# Patient Record
Sex: FEMALE | Race: OTHER
[De-identification: names, ages, dates, MRNs, and addresses within clinical notes are randomized per-mention and may not be internally consistent; named-entity substitution may affect disease eponyms.]

---

## 2017-07-09 ENCOUNTER — HOSPITAL ENCOUNTER (EMERGENCY)
Dept: HOSPITAL 62 - ER | Age: 57
Discharge: HOME | End: 2017-07-09
Payer: SELF-PAY

## 2017-07-09 VITALS — SYSTOLIC BLOOD PRESSURE: 157 MMHG | DIASTOLIC BLOOD PRESSURE: 78 MMHG

## 2017-07-09 DIAGNOSIS — R42: Primary | ICD-10-CM

## 2017-07-09 DIAGNOSIS — R29.818: ICD-10-CM

## 2017-07-09 DIAGNOSIS — R51: ICD-10-CM

## 2017-07-09 DIAGNOSIS — E11.9: ICD-10-CM

## 2017-07-09 LAB
ALBUMIN SERPL-MCNC: 4.1 G/DL (ref 3.5–5)
ALP SERPL-CCNC: 103 U/L (ref 38–126)
ALT SERPL-CCNC: 24 U/L (ref 9–52)
ANION GAP SERPL CALC-SCNC: 12 MMOL/L (ref 5–19)
AST SERPL-CCNC: 21 U/L (ref 14–36)
BASOPHILS # BLD AUTO: 0.1 10^3/UL (ref 0–0.2)
BASOPHILS NFR BLD AUTO: 1 % (ref 0–2)
BILIRUB DIRECT SERPL-MCNC: 0.3 MG/DL (ref 0–0.4)
BILIRUB SERPL-MCNC: 0.5 MG/DL (ref 0.2–1.3)
BUN SERPL-MCNC: 12 MG/DL (ref 7–20)
CALCIUM: 9.3 MG/DL (ref 8.4–10.2)
CHLORIDE SERPL-SCNC: 102 MMOL/L (ref 98–107)
CK MB SERPL-MCNC: 1.05 NG/ML (ref ?–4.55)
CK SERPL-CCNC: 106 U/L (ref 30–135)
CO2 SERPL-SCNC: 25 MMOL/L (ref 22–30)
CREAT SERPL-MCNC: 0.64 MG/DL (ref 0.52–1.25)
EOSINOPHIL # BLD AUTO: 0.1 10^3/UL (ref 0–0.6)
EOSINOPHIL NFR BLD AUTO: 0.8 % (ref 0–6)
ERYTHROCYTE [DISTWIDTH] IN BLOOD BY AUTOMATED COUNT: 16.8 % (ref 11.5–14)
GLUCOSE SERPL-MCNC: 190 MG/DL (ref 75–110)
HCT VFR BLD CALC: 39.7 % (ref 36–47)
HGB BLD-MCNC: 12.6 G/DL (ref 12–15.5)
HGB HCT DIFFERENCE: -1.9
LYMPHOCYTES # BLD AUTO: 1.9 10^3/UL (ref 0.5–4.7)
LYMPHOCYTES NFR BLD AUTO: 25.9 % (ref 13–45)
MCH RBC QN AUTO: 24.2 PG (ref 27–33.4)
MCHC RBC AUTO-ENTMCNC: 31.8 G/DL (ref 32–36)
MCV RBC AUTO: 76 FL (ref 80–97)
MONOCYTES # BLD AUTO: 0.4 10^3/UL (ref 0.1–1.4)
MONOCYTES NFR BLD AUTO: 5.5 % (ref 3–13)
NEUTROPHILS # BLD AUTO: 4.9 10^3/UL (ref 1.7–8.2)
NEUTS SEG NFR BLD AUTO: 66.8 % (ref 42–78)
POTASSIUM SERPL-SCNC: 4.3 MMOL/L (ref 3.6–5)
PROT SERPL-MCNC: 7.3 G/DL (ref 6.3–8.2)
RBC # BLD AUTO: 5.22 10^6/UL (ref 3.72–5.28)
SODIUM SERPL-SCNC: 138.9 MMOL/L (ref 137–145)
TROPONIN I SERPL-MCNC: < 0.012 NG/ML
WBC # BLD AUTO: 7.4 10^3/UL (ref 4–10.5)

## 2017-07-09 PROCEDURE — 93010 ELECTROCARDIOGRAM REPORT: CPT

## 2017-07-09 PROCEDURE — 87880 STREP A ASSAY W/OPTIC: CPT

## 2017-07-09 PROCEDURE — 82550 ASSAY OF CK (CPK): CPT

## 2017-07-09 PROCEDURE — 87070 CULTURE OTHR SPECIMN AEROBIC: CPT

## 2017-07-09 PROCEDURE — 96374 THER/PROPH/DIAG INJ IV PUSH: CPT

## 2017-07-09 PROCEDURE — 70450 CT HEAD/BRAIN W/O DYE: CPT

## 2017-07-09 PROCEDURE — 85025 COMPLETE CBC W/AUTO DIFF WBC: CPT

## 2017-07-09 PROCEDURE — 96361 HYDRATE IV INFUSION ADD-ON: CPT

## 2017-07-09 PROCEDURE — 84484 ASSAY OF TROPONIN QUANT: CPT

## 2017-07-09 PROCEDURE — 80053 COMPREHEN METABOLIC PANEL: CPT

## 2017-07-09 PROCEDURE — 93005 ELECTROCARDIOGRAM TRACING: CPT

## 2017-07-09 PROCEDURE — 96375 TX/PRO/DX INJ NEW DRUG ADDON: CPT

## 2017-07-09 PROCEDURE — 36415 COLL VENOUS BLD VENIPUNCTURE: CPT

## 2017-07-09 PROCEDURE — 71020: CPT

## 2017-07-09 PROCEDURE — 99284 EMERGENCY DEPT VISIT MOD MDM: CPT

## 2017-07-09 PROCEDURE — 82553 CREATINE MB FRACTION: CPT

## 2017-07-09 NOTE — RADIOLOGY REPORT (SQ)
EXAM DESCRIPTION:  CHEST PA/LAT



COMPLETED DATE/TIME:  7/9/2017 5:49 pm



REASON FOR STUDY:  near syncope



COMPARISON:  7/6/2016



EXAM PARAMETERS:  NUMBER OF VIEWS: two views

TECHNIQUE: Digital Frontal and Lateral radiographic views of the chest acquired.

RADIATION DOSE: NA

LIMITATIONS: none



FINDINGS:  LUNGS AND PLEURA: No acute opacities, masses or pneumothorax. No pleural effusion.

MEDIASTINUM AND HILAR STRUCTURES: Stable.

HEART AND VASCULAR STRUCTURES: Heart normal size.  No evidence for failure.

BONES: No acute findings.

HARDWARE: None in the chest.

OTHER: No other significant finding.



IMPRESSION:  No acute finding.



TECHNICAL DOCUMENTATION:  JOB ID:  [id number]

 2011 [employer]- All Rights Reserved

## 2017-07-09 NOTE — ER DOCUMENT REPORT
ED Medical Screen (RME)





- General


Chief Complaint: Dizziness


Stated Complaint: DIZZINESS


Time Seen by Provider: 07/09/17 17:00


Notes: 


57-year-old female presents with complaints of one-week duration of headache 

associated with generalized weakness





I have greeted and performed a rapid initial assessment of this patient.  A 

comprehensive ED assessment and evaluation of the patient, analysis of test 

results and completion of the medical decision making process will be conducted 

by additional ED providers.





PHYSICAL EXAMINATION:





GENERAL: Well-appearing, well-nourished and in no acute distress.





HEAD: Atraumatic, normocephalic.





EYES: Pupils equal round extraocular movements intact,  conjunctiva are normal.





ENT: Nares patent





NECK: Normal range of motion





LUNGS: No respiratory distress





Musculoskeletal: Normal range of motion





NEUROLOGICAL:  Normal speech, normal gait. 





PSYCH: Normal mood, normal affect.





SKIN: Warm, Dry, normal turgor, no rashes or lesions noted.


TRAVEL OUTSIDE OF THE [place] IN LAST 30 DAYS: Yes


COUNTRY TRAVELED TO/FROM: [place]





- Related Data


Allergies/Adverse Reactions: 


 





No Known Allergies Allergy (Verified 07/06/16 04:07)


 











Past Medical History


Endocrine Medical History: Reports: Hx Diabetes Mellitus Type 2


Renal/ Medical History: Denies: Hx Peritoneal Dialysis





- Immunizations


Hx Diphtheria, Pertussis, Tetanus Vaccination: Yes





Physical Exam





- Vital signs


Vitals: 





 











Temp Pulse Resp BP Pulse Ox


 


 98.3 F   89   21 H  170/69 H  100 


 


 07/09/17 16:38  07/09/17 16:38  07/09/17 16:38  07/09/17 16:38  07/09/17 16:38














Course





- Vital Signs


Vital signs: 





 











Temp Pulse Resp BP Pulse Ox


 


 98.3 F   89   21 H  170/69 H  100 


 


 07/09/17 16:38  07/09/17 16:38  07/09/17 16:38  07/09/17 16:38  07/09/17 16:38

## 2017-07-09 NOTE — RADIOLOGY REPORT (SQ)
EXAM DESCRIPTION:  CT HEAD WITHOUT



COMPLETED DATE/TIME:  7/9/2017 5:45 pm



REASON FOR STUDY:  headache, near syncope



COMPARISON:  7/6/2016



TECHNIQUE:  Axial images acquired through the brain without intravenous contrast.  Images reviewed wi
th bone, brain and subdural windows.  Images stored on PACS.

All CT scanners at this facility use dose modulation, iterative reconstruction, and/or weight based d
osing when appropriate to reduce radiation dose to as low as reasonably achievable (ALARA).

[facility]: Dose Right  [facility]: CareDose    [facility]: Dose Right    [facility]: Teradose 4D    [facility]: Smart myQaa



RADIATION DOSE:  Up-to-date CT equipment and radiation dose reduction techniques were employed. CTDIv
ol: 64.6 mGy. DLP: 1034 mGy-cm. mGy.



LIMITATIONS:  None.



FINDINGS:  VENTRICLES: Normal size and contour.

CEREBRUM: No masses.  No hemorrhage.  No midline shift.  Normal gray/white matter differentiation.  N
o evidence for acute infarction.

CEREBELLUM: No masses.  No hemorrhage.  No alteration of density.  No evidence for acute infarction.

EXTRAAXIAL SPACES: No fluid collections.  No masses.

ORBITS AND GLOBE: No intra- or extraconal masses.  Normal contour of globe without masses.

CALVARIUM: No fracture.

PARANASAL SINUSES: No fluid or mucosal thickening.

SOFT TISSUES: No mass or hematoma.

OTHER: No other significant finding.



IMPRESSION:  No acute intracranial findings.



TECHNICAL DOCUMENTATION:  JOB ID:  [id number]

Quality ID # [id number]: Final reports with documentation of one or more dose reduction techniques (e.g., Au
tomated exposure control, adjustment of the mA and/or kV according to patient size, use of iterative 
reconstruction technique)

 2011 [employer]- All Rights Reserved

## 2017-07-09 NOTE — ER DOCUMENT REPORT
ED Dizziness/Weakness





- General


Mode of Arrival: Ambulatory


Information source: Patient


TRAVEL OUTSIDE OF THE [place] IN LAST 30 DAYS: Yes


COUNTRY TRAVELED TO/FROM: [place]





- HPI


Patient complains to provider of: Dizziness


Onset: Other - 1 week


Associated symptoms: Other - see above





<ADRIAN LEON - Last Filed: 07/09/17 18:45>





<LAURA CHANG - Last Filed: 07/09/17 22:46>





- General


Chief Complaint: Dizziness


Stated Complaint: DIZZINESS


Time Seen by Provider: 07/09/17 17:00


Notes: 


Patient is a 57 year old female who presents to the ED with her son as her 

 with complaints of headache, dizziness and her "head feeling hot" x1 

week.  Patients son also reports that the patient has been feeling jittery.  No 

other concerns or complaints at this time.   (ADRIAN LEON)





- Related Data


Allergies/Adverse Reactions: 


 





No Known Allergies Allergy (Verified 07/06/16 04:07)


 











Past Medical History





- General


Information source: Patient





- Social History


Smoking Status: Unknown if Ever Smoked


Family History: Reviewed & Not Pertinent


Patient has suicidal ideation: No


Patient has homicidal ideation: No


Endocrine Medical History: Reports: Hx Diabetes Mellitus Type 2


Renal/ Medical History: Denies: Hx Peritoneal Dialysis





- Immunizations


Hx Diphtheria, Pertussis, Tetanus Vaccination: Yes





<ADRIAN LEON - Last Filed: 07/09/17 18:45>





Review of Systems





- Review of Systems


Constitutional: No symptoms reported


EENT: No symptoms reported


Cardiovascular: See HPI, Dizziness


Respiratory: No symptoms reported


Gastrointestinal: No symptoms reported


Genitourinary: No symptoms reported


Female Genitourinary: No symptoms reported


Musculoskeletal: No symptoms reported


Skin: No symptoms reported


Hematologic/Lymphatic: No symptoms reported


Neurological/Psychological: See HPI, Headaches, Other - "jittery"





<ADRIAN LEON - Last Filed: 07/09/17 18:45>





Physical Exam





- Vital signs


Interpretation: Hypertensive





- General


General appearance: Appears well, Alert





- HEENT


Head: Normocephalic, Atraumatic


Eyes: Normal


Pupils: PERRL





- Respiratory


Respiratory status: No respiratory distress


Chest status: Nontender


Breath sounds: Normal


Chest palpation: Normal





- Cardiovascular


Rhythm: Regular


Heart sounds: Normal auscultation


Murmur: No





- Abdominal


Inspection: Normal


Distension: No distension


Bowel sounds: Normal


Tenderness: Nontender


Organomegaly: No organomegaly





- Back


Back: Normal, Nontender





- Extremities


General upper extremity: Normal inspection, Nontender, Normal color, Normal ROM

, Normal temperature


General lower extremity: Normal inspection, Nontender, Normal color, Normal ROM

, Normal temperature, Normal weight bearing.  No: Keenan's sign





- Neurological


Neuro grossly intact: Yes


Cognition: Normal


Orientation: AAOx4


[place]ama Coma Scale Eye Opening: Spontaneous


Gama Coma Scale Verbal: Oriented


[place]ama Coma Scale Motor: Obeys Commands


[place]ama Coma Scale Total: 15


Speech: Normal


Motor strength normal: LUE, RUE, LLE, RLE


Sensory: Normal





- Psychological


Associated symptoms: Normal affect, Normal mood





- Skin


Skin Temperature: Warm


Skin Moisture: Dry


Skin Color: Normal





<LAURA CHANG - Last Filed: 07/09/17 22:46>





- Vital signs


Vitals: 


 











Temp Pulse Resp BP Pulse Ox


 


 98.3 F   89   21 H  170/69 H  100 


 


 07/09/17 16:38  07/09/17 16:38  07/09/17 16:38  07/09/17 16:38  07/09/17 16:38














Course





- Laboratory


Result Diagrams: 


 07/09/17 17:15





 07/09/17 17:15





<ADRIAN LEON - Last Filed: 07/09/17 18:45>





- Laboratory


Result Diagrams: 


 07/09/17 17:15





 07/09/17 17:15





- Diagnostic Test


Radiology reviewed: Reports reviewed





<LAURA CHANG - Last Filed: 07/09/17 22:46>





- Re-evaluation


Re-evalutation: 





07/09/17 18:40 


Patient rechecked. Patients symptoms are improved. 


 (ADRIAN LEON)








07/09/17 18:46


Patient feels better at this time after fluids and medication.  No acute 

findings on CT or blood work.  Patient will be discharged home and is to follow-

up with her doctor as needed.  Stable for discharge.  Grateful for care. (

LAURA CHANG)





- Vital Signs


Vital signs: 


 











Temp Pulse Resp BP Pulse Ox


 


 98 F   82   18   157/78 H  100 


 


 07/09/17 19:32  07/09/17 19:32  07/09/17 19:32  07/09/17 19:32  07/09/17 19:32














- Laboratory


Laboratory results interpreted by me: 


 











  07/09/17 07/09/17





  17:15 17:15


 


MCV  76 L 


 


MCH  24.2 L 


 


MCHC  31.8 L 


 


RDW  16.8 H 


 


Glucose   190 H














Discharge





<ADRIAN LEON - Last Filed: 07/09/17 18:45>





<LAURA CHANG - Last Filed: 07/09/17 22:46>





- Discharge


Clinical Impression: 


 Dizziness





Headache


Qualifiers:


 Headache type: unspecified Headache chronicity pattern: acute headache 

Intractability: not intractable Qualified Code(s): R51 - Headache





Condition: Stable


Disposition: HOME, SELF-CARE


Instructions:  Dizziness ([facility]), Headache ([facility])


Additional Instructions: 


Please follow-up with your doctor this week.


Prescriptions: 


Ondansetron [Zofran Odt 4 mg Tablet] 1 tab PO Q6HP PRN #15 tab.rapdis


 PRN Reason: For Nausea/Vomiting


Metoclopramide HCl [Reglan 10 mg Tablet] 1 tab PO TIDP PRN #25 tablet


 PRN Reason: 


Scribe Attestation: 





07/09/17 22:46


I personally performed the services described in the documentation, reviewed 

and edited the documentation which was dictated to the scribe in my presence, 

and it accurately records my words and actions. (LAURA CHANG)





Scribe Documentation





- Scribe


Written by Lo:: lo [place], 07/09/2017, 1746


acting as scribe for :: Paulino





<ADRIAN LEON - Last Filed: 07/09/17 18:45>

## 2018-07-04 ENCOUNTER — HOSPITAL ENCOUNTER (EMERGENCY)
Dept: HOSPITAL 62 - ER | Age: 58
LOS: 1 days | Discharge: HOME | End: 2018-07-05
Payer: MEDICAID

## 2018-07-04 DIAGNOSIS — R42: ICD-10-CM

## 2018-07-04 DIAGNOSIS — E11.9: ICD-10-CM

## 2018-07-04 DIAGNOSIS — Z79.899: ICD-10-CM

## 2018-07-04 DIAGNOSIS — R10.13: ICD-10-CM

## 2018-07-04 DIAGNOSIS — F17.200: ICD-10-CM

## 2018-07-04 DIAGNOSIS — R07.89: Primary | ICD-10-CM

## 2018-07-04 LAB
ADD MANUAL DIFF: NO
ALBUMIN SERPL-MCNC: 4.7 G/DL (ref 3.5–5)
ALP SERPL-CCNC: 100 U/L (ref 38–126)
ALT SERPL-CCNC: 31 U/L (ref 9–52)
ANION GAP SERPL CALC-SCNC: 14 MMOL/L (ref 5–19)
AST SERPL-CCNC: 24 U/L (ref 14–36)
BASOPHILS # BLD AUTO: 0.1 10^3/UL (ref 0–0.2)
BASOPHILS NFR BLD AUTO: 1.1 % (ref 0–2)
BILIRUB DIRECT SERPL-MCNC: 0.4 MG/DL (ref 0–0.4)
BILIRUB SERPL-MCNC: 0.5 MG/DL (ref 0.2–1.3)
BUN SERPL-MCNC: 15 MG/DL (ref 7–20)
CALCIUM: 9.7 MG/DL (ref 8.4–10.2)
CHLORIDE SERPL-SCNC: 105 MMOL/L (ref 98–107)
CK SERPL-CCNC: 106 U/L (ref 30–135)
CO2 SERPL-SCNC: 25 MMOL/L (ref 22–30)
EOSINOPHIL # BLD AUTO: 0.1 10^3/UL (ref 0–0.6)
EOSINOPHIL NFR BLD AUTO: 1.3 % (ref 0–6)
ERYTHROCYTE [DISTWIDTH] IN BLOOD BY AUTOMATED COUNT: 16.2 % (ref 11.5–14)
GLUCOSE SERPL-MCNC: 195 MG/DL (ref 75–110)
HCT VFR BLD CALC: 42.2 % (ref 36–47)
HGB BLD-MCNC: 13.7 G/DL (ref 12–15.5)
LYMPHOCYTES # BLD AUTO: 2 10^3/UL (ref 0.5–4.7)
LYMPHOCYTES NFR BLD AUTO: 26.8 % (ref 13–45)
MCH RBC QN AUTO: 25.5 PG (ref 27–33.4)
MCHC RBC AUTO-ENTMCNC: 32.5 G/DL (ref 32–36)
MCV RBC AUTO: 79 FL (ref 80–97)
MONOCYTES # BLD AUTO: 0.5 10^3/UL (ref 0.1–1.4)
MONOCYTES NFR BLD AUTO: 6.4 % (ref 3–13)
NEUTROPHILS # BLD AUTO: 4.8 10^3/UL (ref 1.7–8.2)
NEUTS SEG NFR BLD AUTO: 64.4 % (ref 42–78)
PLATELET # BLD: 218 10^3/UL (ref 150–450)
POTASSIUM SERPL-SCNC: 4.3 MMOL/L (ref 3.6–5)
PROT SERPL-MCNC: 7.7 G/DL (ref 6.3–8.2)
RBC # BLD AUTO: 5.37 10^6/UL (ref 3.72–5.28)
SODIUM SERPL-SCNC: 143.6 MMOL/L (ref 137–145)
TOTAL CELLS COUNTED % (AUTO): 100 %
WBC # BLD AUTO: 7.4 10^3/UL (ref 4–10.5)

## 2018-07-04 PROCEDURE — 82553 CREATINE MB FRACTION: CPT

## 2018-07-04 PROCEDURE — 71046 X-RAY EXAM CHEST 2 VIEWS: CPT

## 2018-07-04 PROCEDURE — 96374 THER/PROPH/DIAG INJ IV PUSH: CPT

## 2018-07-04 PROCEDURE — 82550 ASSAY OF CK (CPK): CPT

## 2018-07-04 PROCEDURE — 84484 ASSAY OF TROPONIN QUANT: CPT

## 2018-07-04 PROCEDURE — 36415 COLL VENOUS BLD VENIPUNCTURE: CPT

## 2018-07-04 PROCEDURE — 93005 ELECTROCARDIOGRAM TRACING: CPT

## 2018-07-04 PROCEDURE — S0164 INJECTION PANTROPRAZOLE: HCPCS

## 2018-07-04 PROCEDURE — 96361 HYDRATE IV INFUSION ADD-ON: CPT

## 2018-07-04 PROCEDURE — 93010 ELECTROCARDIOGRAM REPORT: CPT

## 2018-07-04 PROCEDURE — 99285 EMERGENCY DEPT VISIT HI MDM: CPT

## 2018-07-04 PROCEDURE — 80053 COMPREHEN METABOLIC PANEL: CPT

## 2018-07-04 PROCEDURE — 85025 COMPLETE CBC W/AUTO DIFF WBC: CPT

## 2018-07-05 VITALS — DIASTOLIC BLOOD PRESSURE: 69 MMHG | SYSTOLIC BLOOD PRESSURE: 143 MMHG

## 2018-07-05 LAB
CK MB SERPL-MCNC: 0.85 NG/ML (ref ?–4.55)
TROPONIN I SERPL-MCNC: < 0.012 NG/ML

## 2018-07-05 NOTE — RADIOLOGY REPORT (SQ)
EXAM DESCRIPTION:

XR CHEST 2 VIEWS



COMPLETED DATE/TME:  07/04/2018 23:39



CLINICAL HISTORY:

58 years Female, cp



COMPARISON:

None.



FINDINGS:



Adequate lung volume, clear parenchyma, normal cardiac

silhouette, and intact bony thorax.



IMPRESSION:



No acute cardiopulmonary findings.

## 2018-07-05 NOTE — ER DOCUMENT REPORT
ED General





- General


Chief Complaint: Chest Pain


Stated Complaint: CHEST PAIN


Time Seen by Provider: 07/04/18 23:38


TRAVEL OUTSIDE OF THE [place] IN LAST 30 DAYS: No


COUNTRY TRAVELED TO/FROM: [facility]


Patient complains to provider of: Chest pain dizziness


Notes: 





Patient coming in for complaint chest pain dizziness.  Patient is dizziness 

ongoing for the last 2 weeks.  Patient states dizziness whenever change in 

position also dizziness when she turns her head certain ways.  Patient says she 

has almost passed out and fallen because of the dizziness in the past.  Has not 

been seen by primary care and especially for her dizzy symptoms.  Patient 

states last 24 hours developed what patient's current chest pain however 

localizes the pain in the epigastric region.  Patient states worse when she 

eats food.  Patient denies any nausea vomiting fevers chills diarrhea.  States 

negative cardiac stress test in September of last year.  Patient is diabetic 

compliant with her medications.





- Related Data


Allergies/Adverse Reactions: 


 





No Known Allergies Allergy (Verified 07/06/16 04:07)


 











Past Medical History





- Social History


Smoking Status: Current Some Day Smoker


Chew tobacco use (# tins/day): No


Frequency of alcohol use: None


Drug Abuse: None


Family History: Reviewed & Not Pertinent


Patient has suicidal ideation: No


Patient has homicidal ideation: No


Endocrine Medical History: Reports: Hx Diabetes Mellitus Type 2


Renal/ Medical History: Denies: Hx Peritoneal Dialysis





- Immunizations


Hx Diphtheria, Pertussis, Tetanus Vaccination: Yes





Review of Systems





- Review of Systems


Constitutional: No symptoms reported


EENT: No symptoms reported


Cardiovascular: Chest pain


Respiratory: No symptoms reported


Gastrointestinal: Abdominal pain


Genitourinary: No symptoms reported


Female Genitourinary: No symptoms reported


Musculoskeletal: No symptoms reported


Skin: No symptoms reported


Hematologic/Lymphatic: No symptoms reported


Neurological/Psychological: Other - Dizziness


-: Yes All other systems reviewed and negative





Physical Exam





- Vital signs


Vitals: 





 











Temp Pulse Resp BP Pulse Ox


 


 97.9 F   88   18   169/63 H  97 


 


 07/04/18 23:06  07/04/18 23:06  07/04/18 23:06  07/04/18 23:06  07/04/18 23:06











Interpretation: Normal





- General


General appearance: Appears well, Alert





- HEENT


Head: Normocephalic, Atraumatic


Eyes: Normal


Pupils: PERRL





- Respiratory


Respiratory status: No respiratory distress


Chest status: Nontender


Breath sounds: Normal


Chest palpation: Normal





- Cardiovascular


Rhythm: Regular


Heart sounds: Normal auscultation


Murmur: No





- Abdominal


Inspection: Normal


Distension: No distension


Bowel sounds: Normal


Tenderness: Tender - Mild tenderness epigastric region reproduces patient's pain


Organomegaly: No organomegaly





- Back


Back: Normal, Nontender





- Extremities


General upper extremity: Normal inspection, Nontender, Normal color, Normal ROM

, Normal temperature


General lower extremity: Normal inspection, Nontender, Normal color, Normal ROM

, Normal temperature, Normal weight bearing.  No: Keenan's sign





- Neurological


Neuro grossly intact: Yes


Cognition: Normal


Orientation: AAOx4


[place] Coma Scale Eye Opening: Spontaneous


Gama Coma Scale Verbal: Oriented


[place] Coma Scale Motor: Obeys Commands


[place] Coma Scale Total: 15


Speech: Normal


Motor strength normal: LUE, RUE, LLE, RLE


Sensory: Normal





- Psychological


Associated symptoms: Normal affect, Normal mood





- Skin


Skin Temperature: Warm


Skin Moisture: Dry


Skin Color: Normal





Course





- Re-evaluation


Re-evalutation: 





07/05/18 02:49


The patient has atypical chest pain as the patient's chest pain is not 

suggestive of pulmonary embolus, cardiac ischemia, aortic dissection, or other 

serious etiology. Given the extremely low risk of these diagnoses further 

testing and evaluation for these possibilities does not appear to be indicated 

at this time. The patient has been instructed to return if the symptoms worsen 

or change in any way.  Patient coming in for evaluation of chest pain chest 

pain epigastric pain resolved after GI cocktail.  EKG troponin negative chest x-

ray is also negative for any acute pathology.  Patient's history of dizziness 

is more consistent with possible vertigo.  Patient was given meclizine and did 

encourage patient also try the Epley maneuver however at this time the critical 

etiology seemed to cause her dizziness moving all 4 extremities no other 

neurological deficits.  Patient was also encouraged follow-up with her primary 

care physician for further evaluation possibly by ENT physician if dizziness 

continues.  Patient was given Protonix here will be also prescribed Carafate 

and omeprazole.  Patient states understanding of her workup and possible 

underlying etiologies days agrees discharged home





- Vital Signs


Vital signs: 





 











Temp Pulse Resp BP Pulse Ox


 


 97.9 F   88   18   169/63 H  97 


 


 07/04/18 23:06  07/04/18 23:06  07/04/18 23:06  07/04/18 23:06  07/04/18 23:06














- Laboratory


Result Diagrams: 


 07/04/18 23:23





 07/04/18 23:23


Laboratory results interpreted by me: 





 











  07/04/18 07/04/18





  23:23 23:23


 


RBC  5.37 H 


 


MCV  79 L 


 


MCH  25.5 L 


 


RDW  16.2 H 


 


Glucose   195 H














Discharge





- Discharge


Clinical Impression: 


 Epigastric abdominal pain, Dizziness





Chest pain, unspecified


Qualifiers:


 Chest pain type: unspecified Qualified Code(s): R07.9 - Chest pain, unspecified





Condition: Good


Disposition: HOME, SELF-CARE


Instructions:  Gastritis ([facility]), Prilosec (Acid Pump Inhibitor) ([facility]), Chest 

Pain of Unclear Cause ([facility]), Dizziness ([facility]), Meclizine ([facility]), Vertigo ([facility])


Additional Instructions: 


Your EKG tonight chest x-ray laboratory studies not show any signs cardiac 

ischemia cardiac arrhythmias electrolyte abnormalities.  Your physical 

examination showed more epigastric pain with your pain improving after 

administration of a GI cocktail do believe your pain is related to underlying 

gastritis or inflammation of the stomach lining.  Recommend starting you on a 

medication called Prilosec.  Please take as directed.  Due to possible 

underlying formation of the stomach I would recommend bland diet for the next 

72 hours





Carafate prescribed to help out with any abdominal pain that she may have 

please take this before you eat.





The etiology of your dizziness is unclear and do believe he may have underlying 

vertigo which can be caused by inner ear inflammation or by dysfunction of the 

cochlea.  We will start you on medication for the dizziness, meclizine.  Please 

take this as prescribed.  I would also recommend performing the Epley maneuver 

as directed by the handout at least twice a day for the next week.  I would 

recommend following up with your primary care physician as a further etiologies 

and causes of your dizziness will need to be worked up as outpatient.


Prescriptions: 


Meclizine HCl [Antivert 25 mg Tablet] 25 mg PO TID PRN #30 tablet


 PRN Reason: 


Omeprazole 20 mg PO DAILY #30 capsule.


Sucralfate [Carafate 1 gm Tablet] 1 gm PO ACHS #120 tablet

## 2018-07-23 ENCOUNTER — HOSPITAL ENCOUNTER (OUTPATIENT)
Dept: HOSPITAL 62 - WI | Age: 58
End: 2018-07-23
Attending: NURSE PRACTITIONER
Payer: MEDICAID

## 2018-07-23 DIAGNOSIS — Z12.31: Primary | ICD-10-CM

## 2018-07-23 PROCEDURE — 77063 BREAST TOMOSYNTHESIS BI: CPT

## 2018-07-23 PROCEDURE — 77067 SCR MAMMO BI INCL CAD: CPT

## 2018-07-23 NOTE — WOMENS IMAGING REPORT
EXAM DESCRIPTION:  3D SCREENING MAMMO BILAT



COMPLETED DATE/TIME:  7/23/2018 2:26 pm



REASON FOR STUDY:  SCREENING MAMMO Z12.31  ENCNTR SCREEN MAMMOGRAM FOR MALIGNANT NEOPLASM OF HARRY



COMPARISON:  2012, 2013, 2016



TECHNIQUE:  Standard craniocaudal and mediolateral oblique views of each breast recorded using digita
l acquisition and breast tomosynthesis.



LIMITATIONS:  None.



FINDINGS:  No masses, calcifications or architectural distortion. No areas of suspicion.

Read with the assistance of CAD.

.[facility] - R2 Cenova Version 1.3

.[facility] - R2 Cenova Version 1.3

.[facility] - R2 Cenova Version 2.4

.[facility] - R2 Cenova Version 2.4

.[facility] - R2  Version 9.2



IMPRESSION:  NORMAL MAMMOGRAM.  BIRADS 1.



BREAST DENSITY:  b. There are scattered areas of fibroglandular density.



BIRAD:  1 NEGATIVE



RECOMMENDATION:  ROUTINE SCREENING



COMMENT:  The patient has been notified of the results by letter per SA requirements. Additional no
tification policies are in place for contacting patient with suspicious or incomplete findings.

Quality ID #225: The American College of Radiology recommends an annual screening mammogram for women
 aged 40 years or over. This facility utilizes a reminder system to ensure that all patients receive 
reminder letters, and/or direct phone calls for appointments. This includes reminders for routine scr
eening mammograms, diagnostic mammograms, or other Breast Imaging Interventions when appropriate.  Th
is patient will be placed in the appropriate reminder system.

The American College of Radiology (ACR) has developed recommendations for screening MRI of the breast
s in certain patient populations, to be used in conjunction with mammography.  Breast MRI surveillanc
e may be appropriate for women with more than 20% lifetime risk of developing breast cancer  as deter
mined by genetic testing, significant family history of the disease, or history of mantle radiation f
or Hodgkins Disease.  ACR Practice Guidelines 2008.

[employer]



PQRS 6045F: Fluoroscopic imaging is not utilized for breast tomosynthesis.



TECHNICAL DOCUMENTATION:  FINDING NUMBER: (1)

ASSESSMENT:  (1)

JOB ID:  [id number]

 2011 [employer]- All Rights Reserved



Reading location - IP/workstation name: [email or web address]

## 2019-12-05 ENCOUNTER — HOSPITAL ENCOUNTER (EMERGENCY)
Dept: HOSPITAL 62 - ER | Age: 59
Discharge: HOME | End: 2019-12-05
Payer: MEDICAID

## 2019-12-05 VITALS — SYSTOLIC BLOOD PRESSURE: 142 MMHG | DIASTOLIC BLOOD PRESSURE: 77 MMHG

## 2019-12-05 DIAGNOSIS — R10.816: ICD-10-CM

## 2019-12-05 DIAGNOSIS — R06.02: ICD-10-CM

## 2019-12-05 DIAGNOSIS — R10.13: ICD-10-CM

## 2019-12-05 DIAGNOSIS — K59.00: ICD-10-CM

## 2019-12-05 DIAGNOSIS — R10.32: ICD-10-CM

## 2019-12-05 DIAGNOSIS — E11.9: ICD-10-CM

## 2019-12-05 DIAGNOSIS — K85.90: Primary | ICD-10-CM

## 2019-12-05 DIAGNOSIS — R10.814: ICD-10-CM

## 2019-12-05 DIAGNOSIS — K21.9: ICD-10-CM

## 2019-12-05 DIAGNOSIS — F17.210: ICD-10-CM

## 2019-12-05 DIAGNOSIS — R61: ICD-10-CM

## 2019-12-05 DIAGNOSIS — Z79.84: ICD-10-CM

## 2019-12-05 DIAGNOSIS — I15.2: ICD-10-CM

## 2019-12-05 LAB
ADD MANUAL DIFF: NO
ALBUMIN SERPL-MCNC: 3.7 G/DL (ref 3.5–5)
ALP SERPL-CCNC: 93 U/L (ref 38–126)
ANION GAP SERPL CALC-SCNC: 9 MMOL/L (ref 5–19)
APPEARANCE UR: (no result)
APTT PPP: YELLOW S
AST SERPL-CCNC: 22 U/L (ref 14–36)
BASOPHILS # BLD AUTO: 0.1 10^3/UL (ref 0–0.2)
BASOPHILS NFR BLD AUTO: 1 % (ref 0–2)
BILIRUB DIRECT SERPL-MCNC: 0.2 MG/DL (ref 0–0.4)
BILIRUB SERPL-MCNC: 0.3 MG/DL (ref 0.2–1.3)
BILIRUB UR QL STRIP: NEGATIVE
BUN SERPL-MCNC: 21 MG/DL (ref 7–20)
CALCIUM: 9 MG/DL (ref 8.4–10.2)
CHLORIDE SERPL-SCNC: 107 MMOL/L (ref 98–107)
CO2 SERPL-SCNC: 24 MMOL/L (ref 22–30)
EOSINOPHIL # BLD AUTO: 0.1 10^3/UL (ref 0–0.6)
EOSINOPHIL NFR BLD AUTO: 1.5 % (ref 0–6)
ERYTHROCYTE [DISTWIDTH] IN BLOOD BY AUTOMATED COUNT: 15.1 % (ref 11.5–14)
GLUCOSE SERPL-MCNC: 188 MG/DL (ref 75–110)
GLUCOSE UR STRIP-MCNC: NEGATIVE MG/DL
HCT VFR BLD CALC: 35.7 % (ref 36–47)
HGB BLD-MCNC: 11.9 G/DL (ref 12–15.5)
KETONES UR STRIP-MCNC: NEGATIVE MG/DL
LYMPHOCYTES # BLD AUTO: 1.5 10^3/UL (ref 0.5–4.7)
LYMPHOCYTES NFR BLD AUTO: 19.7 % (ref 13–45)
MCH RBC QN AUTO: 27.3 PG (ref 27–33.4)
MCHC RBC AUTO-ENTMCNC: 33.4 G/DL (ref 32–36)
MCV RBC AUTO: 82 FL (ref 80–97)
MONOCYTES # BLD AUTO: 0.4 10^3/UL (ref 0.1–1.4)
MONOCYTES NFR BLD AUTO: 5.9 % (ref 3–13)
NEUTROPHILS # BLD AUTO: 5.4 10^3/UL (ref 1.7–8.2)
NEUTS SEG NFR BLD AUTO: 71.9 % (ref 42–78)
NITRITE UR QL STRIP: NEGATIVE
PH UR STRIP: 5 [PH] (ref 5–9)
PLATELET # BLD: 168 10^3/UL (ref 150–450)
POTASSIUM SERPL-SCNC: 4.7 MMOL/L (ref 3.6–5)
PROT SERPL-MCNC: 6.8 G/DL (ref 6.3–8.2)
PROT UR STRIP-MCNC: NEGATIVE MG/DL
RBC # BLD AUTO: 4.35 10^6/UL (ref 3.72–5.28)
SP GR UR STRIP: 1.01
TOTAL CELLS COUNTED % (AUTO): 100 %
UROBILINOGEN UR-MCNC: NEGATIVE MG/DL (ref ?–2)
WBC # BLD AUTO: 7.6 10^3/UL (ref 4–10.5)

## 2019-12-05 PROCEDURE — 93010 ELECTROCARDIOGRAM REPORT: CPT

## 2019-12-05 PROCEDURE — 74177 CT ABD & PELVIS W/CONTRAST: CPT

## 2019-12-05 PROCEDURE — 99285 EMERGENCY DEPT VISIT HI MDM: CPT

## 2019-12-05 PROCEDURE — 85025 COMPLETE CBC W/AUTO DIFF WBC: CPT

## 2019-12-05 PROCEDURE — 71046 X-RAY EXAM CHEST 2 VIEWS: CPT

## 2019-12-05 PROCEDURE — 93005 ELECTROCARDIOGRAM TRACING: CPT

## 2019-12-05 PROCEDURE — 96361 HYDRATE IV INFUSION ADD-ON: CPT

## 2019-12-05 PROCEDURE — 84484 ASSAY OF TROPONIN QUANT: CPT

## 2019-12-05 PROCEDURE — 83690 ASSAY OF LIPASE: CPT

## 2019-12-05 PROCEDURE — 36415 COLL VENOUS BLD VENIPUNCTURE: CPT

## 2019-12-05 PROCEDURE — 96374 THER/PROPH/DIAG INJ IV PUSH: CPT

## 2019-12-05 PROCEDURE — 76705 ECHO EXAM OF ABDOMEN: CPT

## 2019-12-05 PROCEDURE — 80053 COMPREHEN METABOLIC PANEL: CPT

## 2019-12-05 PROCEDURE — 81001 URINALYSIS AUTO W/SCOPE: CPT

## 2019-12-05 NOTE — RADIOLOGY REPORT (SQ)
EXAM DESCRIPTION:  U/S ABDOMEN LIMITED W/O DOP



COMPLETED DATE/TIME:  12/5/2019 7:56 am



REASON FOR STUDY:  elevated lipase



COMPARISON:  None.



TECHNIQUE:  Dynamic and static grayscale images acquired of the abdomen and recorded on PACS. Additio
nal selected color Doppler and spectral images recorded.



LIMITATIONS:  Body habitus, midline bowel gas



FINDINGS:  PANCREAS: Midline pancreas unremarkable

LIVER: Diffuse increased echogenicity from fatty infiltration.  No masses.  No biliary ductal dilatat
ion

LIVER VASCULATURE: Normal directional flow of the main portal vein and hepatic veins.

GALLBLADDER: No stones. Normal wall thickness. No pericholecystic fluid.

ULTRASOUND-DETECTED RODRIGUEZ'S SIGN: Negative.

INTRAHEPATIC DUCTS AND COMMON DUCT: CBD and intrahepatic ducts normal caliber. No filling defects.

INFERIOR VENA CAVA: Normal flow.

AORTA: No aneurysm.

RIGHT KIDNEY:  Normal size. Normal echogenicity. No solid or suspicious masses. No hydronephrosis. No
 calcifications.

PERITONEAL AND RIGHT PLEURAL SPACE: No ascites or effusions.

OTHER: No other significant findings.



IMPRESSION:  Fatty liver

No gallstones, gallbladder wall thickening pericholecystic fluid



TECHNICAL DOCUMENTATION:  JOB ID:  [id number]

 [employer]- All Rights Reserved



Reading location - IP/workstation name: [email or web address]

## 2019-12-05 NOTE — ER DOCUMENT REPORT
ED Medical Screen (RME)





- General


Chief Complaint: Shortness Of Breath


Stated Complaint: SHORTNESS OF BREATH


Time Seen by Provider: 12/05/19 04:41


Primary Care Provider: 


SARINA SCHMIDT MD [Primary Care Provider] - Follow up as needed


Notes: 


59-year-old female with chief complaint of pain in the left upper abdomen that 

went up into her chest, she states she awoke with this this morning, she states 

it felt like she could not breathe and she broke out into a sweat.  She also 

reported feeling nauseated.  She denies vomiting, flank pain, dizziness, she 

states symptoms have almost completely resolved now.  She states she had the 

same symptoms last night.  She denies history of reflux.  She is treated for 

type 2 diabetes, denies medical history otherwise.





TRAVEL OUTSIDE OF THE [place] IN LAST 30 DAYS: No


COUNTRY TRAVELED TO/FROM: [place]





- Related Data


Allergies/Adverse Reactions: 


                                        





No Known Allergies Allergy (Verified 07/06/16 04:07)


   








Home Medications: Metformin





Past Medical History


Endocrine Medical History: Reports: Hx Diabetes Mellitus Type 2


Renal/ Medical History: Denies: Hx Peritoneal Dialysis





- Immunizations


Hx Diphtheria, Pertussis, Tetanus Vaccination: Yes





Physical Exam





- Vital signs


Vitals: 





                                        











Temp Pulse Resp BP Pulse Ox


 


 97.6 F   80   22 H  158/67 H  97 


 


 12/05/19 03:15  12/05/19 03:15  12/05/19 03:15  12/05/19 03:15  12/05/19 03:15














- Abdominal


Tenderness: Tender - There is some tenderness along the left mid upper abdomen, 

no guarding, remaining abdomen benign





Course





- Re-evaluation


Re-evalutation: 


I have greeted and performed a rapid initial assessment of this patient.  A comp

rehensive ED assessment and evaluation of the patient, analysis of test results 

and completion of the medical decision making process will be conducted by 

additional ED providers.








- Vital Signs


Vital signs: 





                                        











Temp Pulse Resp BP Pulse Ox


 


 97.6 F   80   22 H  158/67 H  97 


 


 12/05/19 03:15  12/05/19 03:15  12/05/19 03:15  12/05/19 03:15  12/05/19 03:15














Doctor's Discharge





- Discharge


Referrals: 


SARINA SCHMIDT MD [Primary Care Provider] - Follow up as needed

## 2019-12-05 NOTE — RADIOLOGY REPORT (SQ)
EXAM DESCRIPTION:

XR CHEST 2 VIEWS



COMPLETED DATE/TME:  12/05/2019 00:00



CLINICAL HISTORY:

59 years Female, C/P 



COMPARISON:

7/5/18



NUMBER OF VIEWS/TECHNIQUE:

2, Frontal, Lateral



FINDINGS:



Adequate lung volume, clear parenchyma, normal cardiac

silhouette, and intact bony thorax.



IMPRESSION:



No acute cardiopulmonary findings.

## 2019-12-05 NOTE — RADIOLOGY REPORT (SQ)
EXAM DESCRIPTION:  CT ABD/PELVIS WITH IV ONLY



COMPLETED DATE/TIME:  12/5/2019 9:20 am



REASON FOR STUDY:  LLQ abd pain, elevated lipase



COMPARISON:  Abdominal ultrasound 12/5/2019



TECHNIQUE:  CT scan of the abdomen and pelvis performed using helical scanning technique with dynamic
 intravenous contrast injection.  No oral contrast. Images reviewed with lung, soft tissue, and bone 
windows. Reconstructed coronal and sagittal MPR images reviewed. Delayed images for evaluation of the
 urinary system also acquired. All images stored on PACS.

All CT scanners at this facility use dose modulation, iterative reconstruction, and/or weight based d
osing when appropriate to reduce radiation dose to as low as reasonably achievable (ALARA).

[facility]: Dose Right  [facility]: CareDose    [facility]: Dose Right    [facility]: Teradose 4D    [facility]: Smart Technologies



CONTRAST TYPE AND DOSE:  contrast/concentration: Isovue 350.00 mg/ml; Total Contrast Delivered: 94.0 
ml; Total Saline Delivered: 70.0 ml



RENAL FUNCTION:  Creatinine 0.8



RADIATION DOSE:  CT Rad equipment meets quality standard of care and radiation dose reduction techniq
ues were employed. CTDIvol: 11.0 - 14.8 mGy. DLP: 1437 mGy-cm..



LIMITATIONS:  None.



FINDINGS:  LOWER CHEST: 7 mm nodule left posterior costophrenic sulcus axial image 11/30.  This could
 represent pleuroparenchymal scarring.  Other smaller foci of pleural thickening are seen along the r
ight posterior costophrenic sulcus.  Outpatient follow-up chest CT recommended

LIVER: Low attenuation from fatty infiltration.  No masses.  No biliary ductal dilatation

SPLEEN: Normal size. No focal lesions.

PANCREAS: No masses. No significant calcifications. No adjacent inflammation or peripancreatic fluid 
collections. Pancreatic duct not dilated.

GALLBLADDER: No identified stones by CT criteria. No inflammatory changes to suggest cholecystitis.

ADRENAL GLANDS: No significant masses or asymmetry.

RIGHT KIDNEY AND URETER: No solid masses.   No significant calcifications.   No hydronephrosis or hyd
roureter.

LEFT KIDNEY AND URETER: No solid masses.   No significant calcifications.   No hydronephrosis or hydr
oureter.

AORTA AND VESSELS: No aneurysm. No dissection. Renal arteries, SMA, celiac without stenosis.

RETROPERITONEUM: No retroperitoneal adenopathy, hemorrhage or masses.

BOWEL AND PERITONEAL CAVITY: No oral contrast.  Moderate stool in the ascending and transverse colon.
  No CT evidence of bowel obstruction or free intraperitoneal air or fluid.  Few colonic diverticuli.


APPENDIX: Normal.

PELVIS: No mass.  No free fluid. Normal bladder.  Normal size female pelvic organs

ABDOMINAL WALL: No masses. No hernias.

BONES: Lumbar degenerative disc changes

OTHER: No other significant finding.



IMPRESSION:  No CT evidence of acute peripancreatic inflammation.

Fatty infiltration of the liver

Pleural-parenchymal scarring in lung bases posteriorly



TECHNICAL DOCUMENTATION:  JOB ID:  [id number]

Quality ID # [id number]: Final reports with documentation of one or more dose reduction techniques (e.g., Au
tomated exposure control, adjustment of the mA and/or kV according to patient size, use of iterative 
reconstruction technique)

 2011 [employer]- All Rights Reserved



Reading location - IP/workstation name: [email or web address]

## 2019-12-05 NOTE — ER DOCUMENT REPORT
Entered by JARED JUNIOR SCRIBE  12/05/19 0646 





Acting as scribe for:GINO GR MD





ED General





- General


Chief Complaint: Shortness Of Breath


Stated Complaint: SHORTNESS OF BREATH


Time Seen by Provider: 12/05/19 04:41


Primary Care Provider: 


SARINA SCHMIDT MD [Primary Care Provider] - Follow up as needed


Mode of Arrival: Ambulatory


Information source: Patient


Notes: 





This 59-year-old female patient with type 2 diabetes on metformin presents to 

the emergency department today with complaints of waking up this morning at 2 AM

this morning "sweating with shortness of breath".   at bedside states at 

that time the patient had no pain but sometime after developed abdominal pain.  

Patient complains of pain in the left lower quadrant and epigastric region. 

Patient is a poor historian so history is limited. 


TRAVEL OUTSIDE OF THE [place] IN LAST 30 DAYS: No


COUNTRY TRAVELED TO/FROM: [place]





- Related Data


Allergies/Adverse Reactions: 


                                        





No Known Allergies Allergy (Verified 07/06/16 04:07)


   








Home Medications: Metformin





Past Medical History





- General


Information source: Patient





- Social History


Smoking Status: Current Every Day Smoker


Cigarette use (# per day): Yes


Frequency of alcohol use: None


Drug Abuse: None


Lives with: Family


Family History: Reviewed & Not Pertinent


Patient has suicidal ideation: No


Patient has homicidal ideation: No


Endocrine Medical History: Reports: Hx Diabetes Mellitus Type 2





- Immunizations


Hx Diphtheria, Pertussis, Tetanus Vaccination: Yes





Review of Systems





- Review of Systems


Constitutional: See HPI, Diaphoresis


EENT: No symptoms reported


Cardiovascular: No symptoms reported


Respiratory: See HPI, Short of breath


Gastrointestinal: See HPI, Abdominal pain


Genitourinary: No symptoms reported


Female Genitourinary: No symptoms reported


Musculoskeletal: No symptoms reported


Skin: No symptoms reported


Hematologic/Lymphatic: No symptoms reported


Neurological/Psychological: No symptoms reported


-: Yes All other systems reviewed and negative





Physical Exam





- Vital signs


Vitals: 


                                        











Temp Pulse Resp BP Pulse Ox


 


 97.6 F   80   22 H  158/67 H  97 


 


 12/05/19 03:15  12/05/19 03:15  12/05/19 03:15  12/05/19 03:15  12/05/19 03:15














- Notes


Notes: 





Physical Exam:


 


General: Alert, appears well. 


 


HEENT: Normocephalic. Atraumatic. PERRL. Extraocular movements intact. 

Oropharynx clear.


 


Neck: Supple. Non-tender.


 


Respiratory: No respiratory distress. Clear and equal breath sounds bilaterally.


 


Cardiovascular: Regular rate and rhythm. 


 


Abdominal: Epigastric and left lower quadrant tenderness with palpation. No 

distension. Normal Bowel Sounds. 


 


Back: No gross abnormalities. 


 


Extremities: Moves all four extremities.


Upper extremities: Normal inspection. Normal ROM.  


Lower extremities: Normal inspection. No edema. Normal ROM.


 


Neurological: Normal cognition. AAOx4. Normal speech.  


 


Psychological: Normal affect. Normal Mood. 


 


Skin: Warm. Dry. Normal color.





Course





- Re-evaluation


Re-evalutation: 





12/05/19 09:58


The patient's lab work shows an elevated lipase at 537 with no other abnormalit

ies.  She has had lipase checked once almost 5 years ago and it was 209.


The gallbladder ultrasound is unremarkable, IV contrasted CT scan of the abdomen

pelvis shows only large amounts of stool.





The patient's blood pressure is elevated today.  I reviewed all her visits over 

the last several years and it is always elevated.  She does have diabetes and 

she is not on lisinopril or any other blood pressure medication.  Spouse reports

that he has blood pressure machine at home and keeps watch on her blood pressure

and his and states that they are always on the borderline.


I gave him a copy of a print out showing all of her blood pressures here, and 

wrote the word lisinopril on it and asked that he discuss this with her primary 

care provider or with her endocrinologist.








- Vital Signs


Vital signs: 


                                        











Temp Pulse Resp BP Pulse Ox


 


 98.4 F   80   19   142/77 H  98 


 


 12/05/19 09:05  12/05/19 03:15  12/05/19 10:02  12/05/19 10:02  12/05/19 10:02














- Laboratory


Result Diagrams: 


                                 12/05/19 04:50





                                 12/05/19 04:50


Laboratory results interpreted by me: 


                                        











  12/05/19 12/05/19 12/05/19





  04:50 04:50 04:50


 


Hgb  11.9 L  


 


Hct  35.7 L  


 


RDW  15.1 H  


 


BUN   21 H 


 


Glucose   188 H 


 


Lipase    537.0 H


 


Ur Leukocyte Esterase   














  12/05/19





  07:56


 


Hgb 


 


Hct 


 


RDW 


 


BUN 


 


Glucose 


 


Lipase 


 


Ur Leukocyte Esterase  SMALL H














- Diagnostic Test


Radiology reviewed: Image reviewed, Reports reviewed - Gallbladder ultrasound is

unremarkable.  CT scan of the abdomen pelvis with IV contrast shows a lot of 

stool but no other abnormalities.





- EKG Interpretation by Me


EKG shows normal: Sinus rhythm, Axis, Intervals, QRS Complexes, ST-T Waves


Rate: Normal - 82


Rhythm: NSR





Discharge





- Discharge


Clinical Impression: 


 High blood pressure associated with diabetes





Pancreatitis


Qualifiers:


 Chronicity: acute Pancreatitis type: unspecified pancreatitis type Acute 

pancreatitis complication: unspecified Qualified Code(s): K85.90 - Acute 

pancreatitis without necrosis or infection, unspecified





Constipation


Qualifiers:


 Constipation type: unspecified constipation type Qualified Code(s): K59.00 - 

Constipation, unspecified





GERD (gastroesophageal reflux disease)


Qualifiers:


 Esophagitis presence: esophagitis presence not specified Qualified Code(s): 

K21.9 - Gastro-esophageal reflux disease without esophagitis





Condition: Stable


Disposition: HOME, SELF-CARE


Additional Instructions: 


Pancreatitis:





     Pancreatitis is an inflammation of the pancreas, an organ at the back of 

your abdomen.  The pancreas produces insulin and enzymes that digest your food. 

Pancreatitis can be caused by gallstones in the bile duct, by alcohol or 

viruses, or by excess fat or calcium in the blood stream.  Occasionally, 

pancreatitis occurs when a stomach ulcer burns through into the pancreas.  We 

try to find the cause of pancreatitis, but some tests can't be done until the 

pancreas heals.


     The usual symptoms of pancreatitis are pain in the pit of the stomach that 

goes straight through to the back, vomiting, and low-grade fever.  Severe cases 

require hospital admission, but many patients with mild pancreatitis do well at 

home.


     Sometimes we prescribe medicine to decrease stomach acid secretion and to 

decrease flow of pancreatic juices.  Start with a diet of clear liquids (soda 

pop, juices).  When the pain is decreasing, you can add some simple starches 

(potato, toast, applesauce).  Avoid proteins and fats until you are completely 

painfree.


     When you're better, your doctor may suggest treatment to prevent future 

pancreatitis (such as gallbladder removal).  Avoid alcohol forever.  Get 

immediate treatment for any future episodes.


     Contact your doctor at once or return here if you have increasing pain, 

shortness of breath, general swelling, increasing size of the abdomen, continued

vomiting, muscle spasms, or other new symptoms.





Reflux Disease (GERD):





     Gastro-Esophageal Reflux Disease (GERD) is caused by stomach acid refluxing

back up into the esophagus. The valve at the end of the esophagus may be weak. 

This is common in persons with a hiatal hernia. GERD symptoms can include 

indigestion, chest pain, heartburn, or food "sticking." Certain foods, alcohol, 

and aspirin can make GERD worse.


     Treatment depends on the severity. Usually, antacids or acid-suppressing 

medicines are used. When the esophagus is acutely inflamed, the physician will 

often prescribe membrane-protective drugs such as Carafate.  Some patients 

benefit from medication such as Reglan that tightens the valve at the top of the

stomach.


     Avoid those foods that bring on your symptoms. For many people, these foods

are coffee, chocolate, onions, garlic, and carbonated drinks. Don't use alcohol,

aspirin, caffeine, or tobacco. Don't eat late at night -- within 4 hours of 

bedtime. Don't over-eat. If necessary, elevate the head of your bed about 4 

inches so that stomach acid will not roll up into your esophagus.


     Call the doctor if you develop severe chest pain, inability to swallow 

fluids, fever, or worsening symptoms.








Constipation:





     Constipation is a common problem.  It is especially likely as you get o

lder.  Constipation is a common cause of abdominal pain, but sometimes causes no

symptoms at all.  Causes of constipation include certain medications, 

dehydration, diets, inactivity, and low-fiber intake.  Rarely, it can be a 

symptom of underlying disease.  The physician has evaluated you for this.


     Avoid constipation by eating a diet high in fiber, fruits, and vegetables. 

Drink plenty of liquids.  Get regular exercise.  If possible, avoid constipating

medicines like narcotic pain medication.  Some vitamin tablets can cause 

constipation.  Stool softeners may be needed for difficult cases.  An excellent 

stool softener is Konsyl which is available at [employer], [place] 

drug store.  Just add a teaspoon to a glass of pineapple or orange juice daily 

or twice a day if needed.


   Laxatives are useful for occasional constipation.  You should use them only 

when necessary.  Too-frequent use can make your bowels dependent on them.  Some 

over the counter laxatives available without prescription are:





   Milk of Magnesia, 1-2 tablespoons twice a day


    Dulcolax, 5 mg pill or 10 mg suppository.


   Citrate of Magnesia, 4-5 ounces a day for a day or two





For acute constipation, Fleet's Enemas and Dulcolax suppositories are helpful.


     Chronic, long term  use of laxatives or enemas is not a good idea.  Your 

bowel may become dependant on them.  You do not need to have a bowel movement 

every day.  Many people do fine with a bowel movement every three or four days.


     You should call your doctor or return for re-evaluation if you pass blood 

in the stool, or if you develop fever or increasing abdominal pain.








High Blood Pressure:





   Your blood pressure is high.  This is called "hypertension."  Your history 

and exam suggest that this is not a temporary problem. You need treatment of 

your blood pressure.


   


          Hypertension: The patient has been informed that they may have 

Hypertension based on a blood pressure reading in the emergency department. I 

recommend that the patient call the primary care provider or endocrinologist to 

discuss treatment.





   If left untreated, high blood pressure greatly increases your risk of heart 

attack and stroke.  Please don't ignore this problem. If you have blood pressure

medicine but aren't using it regularly, start taking it again. 





   Some simple things you can do to help are: Get some aerobic exercise for at 

least 20 minutes on a daily basis. (See your doctor before beginning any new 

exercise program.)  Eat a low-fat diet. Lose excess weight.  Avoid salty foods 

and avoid adding salt to any of the foods you eat.  Avoid diet pills, 

decongestants, "energizing" herbs, and other medicines that elevate blood 

pressure.





         Treating hypertension is a life-long investment in your health.








***********************************

***********************************************************





Take Prilosec OTC once daily for the next 1 to 2 weeks.


Stay on clear liquids as described above under the pancreatitis section, until 

you are better.


Take MiraLAX once daily to help keep your bowels moving.





Follow-up with your primary care provider this week if not improving.


Follow-up with your primary care provider or your endocrinologist to discuss 

blood pressure management.





RETURN TO THE EMERGENCY ROOM IF ANY NEW OR WORSENING SYMPTOMS.








Scribe Attestation: 





12/05/19 07:50


I personally performed the services described in the documentation, reviewed and

edited the documentation which was dictated to the scribe in my presence, and it

accurately records my words and actions.





I personally performed the services described in the documentation, reviewed and

edited the documentation which was dictated to the scribe in my presence, and it

accurately records my words and actions.

## 2019-12-19 ENCOUNTER — HOSPITAL ENCOUNTER (OUTPATIENT)
Dept: HOSPITAL 62 - WI | Age: 59
End: 2019-12-19
Attending: PHYSICIAN ASSISTANT
Payer: MEDICAID

## 2019-12-19 DIAGNOSIS — Z12.31: Primary | ICD-10-CM

## 2019-12-19 PROCEDURE — 77063 BREAST TOMOSYNTHESIS BI: CPT

## 2019-12-19 PROCEDURE — 77067 SCR MAMMO BI INCL CAD: CPT

## 2019-12-19 NOTE — WOMENS IMAGING REPORT
EXAM DESCRIPTION:  3D SCREENING MAMMO BILAT



COMPLETED DATE/TIME:  12/19/2019 11:17 am



REASON FOR STUDY:  Z12.39 SCREENING MAMMO Z12.39  ENCOUNTER FOR [facility] SCREENING FOR MALIGNANT NEOPLASM 
OF



COMPARISON:  Multiple since 2012



EXAM PARAMETERS:  Views: Standard craniocaudal and mediolateral oblique views of each breast recorded
 using digital acquisition and breast tomosynthesis.

Read with the assistance of CAD.

.[facility]  Version 9.2



LIMITATIONS:  None.



FINDINGS:  No suspicious masses, suspicious calcifications or architectural distortion. No areas of c
oncern.



IMPRESSION:   NEGATIVE MAMMOGRAM. BIRADS 1.



BREAST DENSITY:  b. There are scattered areas of fibroglandular density.



BIRAD:  ASSESSMENT:  1 NEGATIVE



RECOMMENDATION:  ROUTINE SCREENING

Please continue yearly bilateral screening mammography/tomosynthesis in December 2020.



COMMENT:  The patient has been notified of the results by letter per MQSA requirements. Additional no
tification policies are in place for contacting patient with suspicious or incomplete findings.

Quality ID #225: The American College of Radiology recommends an annual screening mammogram for women
 aged 40 years or over. This facility utilizes a reminder system to ensure that all patients receive 
reminder letters, and/or direct phone calls for appointments. This includes reminders for routine scr
eening mammograms, diagnostic mammograms, or other Breast Imaging Interventions when appropriate.  Th
is patient will be placed in the appropriate reminder system.



TECHNICAL DOCUMENTATION:  FINDING NUMBER: (1)

ASSESSMENT:  (1)

JOB ID:  [id number]

 2011 [employer]- All Rights Reserved



Reading location - IP/workstation name: [email or web address]